# Patient Record
Sex: FEMALE | Race: BLACK OR AFRICAN AMERICAN | NOT HISPANIC OR LATINO | URBAN - METROPOLITAN AREA
[De-identification: names, ages, dates, MRNs, and addresses within clinical notes are randomized per-mention and may not be internally consistent; named-entity substitution may affect disease eponyms.]

---

## 2019-06-18 ENCOUNTER — OUTPATIENT (OUTPATIENT)
Dept: OUTPATIENT SERVICES | Facility: HOSPITAL | Age: 69
LOS: 1 days | End: 2019-06-18
Payer: MEDICARE

## 2019-06-18 VITALS
OXYGEN SATURATION: 97 % | HEIGHT: 60 IN | DIASTOLIC BLOOD PRESSURE: 59 MMHG | WEIGHT: 163.14 LBS | SYSTOLIC BLOOD PRESSURE: 106 MMHG | RESPIRATION RATE: 15 BRPM | HEART RATE: 76 BPM | TEMPERATURE: 97 F

## 2019-06-18 VITALS
RESPIRATION RATE: 14 BRPM | HEART RATE: 88 BPM | OXYGEN SATURATION: 100 % | SYSTOLIC BLOOD PRESSURE: 128 MMHG | DIASTOLIC BLOOD PRESSURE: 72 MMHG

## 2019-06-18 DIAGNOSIS — Z90.710 ACQUIRED ABSENCE OF BOTH CERVIX AND UTERUS: Chronic | ICD-10-CM

## 2019-06-18 DIAGNOSIS — Z41.9 ENCOUNTER FOR PROCEDURE FOR PURPOSES OTHER THAN REMEDYING HEALTH STATE, UNSPECIFIED: Chronic | ICD-10-CM

## 2019-06-18 DIAGNOSIS — H25.811 COMBINED FORMS OF AGE-RELATED CATARACT, RIGHT EYE: ICD-10-CM

## 2019-06-18 LAB — GLUCOSE BLDC GLUCOMTR-MCNC: 227 MG/DL — HIGH (ref 70–99)

## 2019-06-18 PROCEDURE — 66984 XCAPSL CTRC RMVL W/O ECP: CPT | Mod: RT

## 2019-06-18 PROCEDURE — C1780: CPT

## 2019-06-18 PROCEDURE — 82962 GLUCOSE BLOOD TEST: CPT

## 2019-06-18 NOTE — ASU DISCHARGE PLAN (ADULT/PEDIATRIC) - CARE PROVIDER_API CALL
Agatha Farrell)  Ophthalmology  [address] [place] [address], [address] [place] [address]  Phone: [phone]  Fax: [phone]  Follow Up Time:

## 2019-06-18 NOTE — ASU DISCHARGE PLAN (ADULT/PEDIATRIC) - CALL YOUR DOCTOR IF YOU HAVE ANY OF THE FOLLOWING:
Inability to tolerate liquids or foods/Nausea and vomiting that does not stop/Bleeding that does not stop/Pain not relieved by Medications

## 2019-10-05 PROBLEM — J44.9 CHRONIC OBSTRUCTIVE PULMONARY DISEASE, UNSPECIFIED: Chronic | Status: ACTIVE | Noted: 2019-06-18

## 2019-10-05 PROBLEM — E11.9 TYPE 2 DIABETES MELLITUS WITHOUT COMPLICATIONS: Chronic | Status: ACTIVE | Noted: 2019-06-18

## 2019-10-05 PROBLEM — G47.30 SLEEP APNEA, UNSPECIFIED: Chronic | Status: ACTIVE | Noted: 2019-06-18

## 2019-10-05 PROBLEM — E55.9 VITAMIN D DEFICIENCY, UNSPECIFIED: Chronic | Status: ACTIVE | Noted: 2019-06-18

## 2019-10-09 ENCOUNTER — OUTPATIENT (OUTPATIENT)
Dept: OUTPATIENT SERVICES | Facility: HOSPITAL | Age: 69
LOS: 1 days | End: 2019-10-09
Payer: MEDICARE

## 2019-10-09 VITALS
HEART RATE: 90 BPM | WEIGHT: 152.56 LBS | DIASTOLIC BLOOD PRESSURE: 78 MMHG | SYSTOLIC BLOOD PRESSURE: 122 MMHG | HEIGHT: 60 IN | TEMPERATURE: 98 F | RESPIRATION RATE: 13 BRPM | OXYGEN SATURATION: 95 %

## 2019-10-09 VITALS
RESPIRATION RATE: 15 BRPM | SYSTOLIC BLOOD PRESSURE: 111 MMHG | OXYGEN SATURATION: 95 % | DIASTOLIC BLOOD PRESSURE: 62 MMHG | HEART RATE: 89 BPM

## 2019-10-09 DIAGNOSIS — Z41.9 ENCOUNTER FOR PROCEDURE FOR PURPOSES OTHER THAN REMEDYING HEALTH STATE, UNSPECIFIED: Chronic | ICD-10-CM

## 2019-10-09 DIAGNOSIS — H25.812 COMBINED FORMS OF AGE-RELATED CATARACT, LEFT EYE: ICD-10-CM

## 2019-10-09 DIAGNOSIS — Z90.710 ACQUIRED ABSENCE OF BOTH CERVIX AND UTERUS: Chronic | ICD-10-CM

## 2019-10-09 DIAGNOSIS — Z98.41 CATARACT EXTRACTION STATUS, RIGHT EYE: Chronic | ICD-10-CM

## 2019-10-09 LAB — GLUCOSE BLDC GLUCOMTR-MCNC: 137 MG/DL — HIGH (ref 70–99)

## 2019-10-09 PROCEDURE — 82962 GLUCOSE BLOOD TEST: CPT

## 2019-10-09 PROCEDURE — V2632: CPT

## 2019-10-09 PROCEDURE — 66984 XCAPSL CTRC RMVL W/O ECP: CPT | Mod: LT

## 2019-10-09 NOTE — ASU PATIENT PROFILE, ADULT - VISION (WITH CORRECTIVE LENSES IF THE PATIENT USUALLY WEARS THEM):
Partially impaired: cannot see medication labels or newsprint, but can see obstacles in path, and the surrounding layout; can count fingers at arm's length/Left eye

## 2019-10-09 NOTE — ASU DISCHARGE PLAN (ADULT/PEDIATRIC) - NURSING INSTRUCTIONS
do not rub eye. tylenol for discomfort.  avoid advil motrin aleve aspirin to decrease chance of bleeding. eye kit given to pt.  Discharge and follow up  instructions explained to pt.  pt understands proper use of eye drops and instructions. f/u tomorrow do not rub eye. tylenol for discomfort.  avoid advil motrin aleve aspirin to decrease chance of bleeding. eye kit given to pt.  Discharge and follow up  instructions explained to pt.  pt understands proper use of eye drops and instructions. f/u tomorrow@[id number]

## 2019-10-09 NOTE — ASU PATIENT PROFILE, ADULT - PSH
Elective surgery  s/p right lung resection  S/P hysterectomy Elective surgery  s/p right lung resection  S/P cataract surgery, right    S/P hysterectomy

## 2019-10-09 NOTE — ASU DISCHARGE PLAN (ADULT/PEDIATRIC) - CALL YOUR DOCTOR IF YOU HAVE ANY OF THE FOLLOWING:
Nausea and vomiting that does not stop/Pain not relieved by Medications/Bleeding that does not stop/Inability to tolerate liquids or foods/Fever greater than (need to indicate Fahrenheit or Celsius)

## 2019-10-09 NOTE — ASU DISCHARGE PLAN (ADULT/PEDIATRIC) - CARE PROVIDER_API CALL
Agatha Farrell)  Ophthalmology  [address] [place] [address], [address] [place] [address]  Phone: [phone]  Fax: [phone]  Follow Up Time:

## 2021-02-26 NOTE — ASU PREOP CHECKLIST - NS PREOP CHK CHLOROHEX WASH
Detail Level: Zone
Introduction Text (Please End With A Colon): The following procedure was deferred:
Procedure To Be Performed At Next Visit: Curettage
N/A

## 2022-11-08 ENCOUNTER — OFFICE (OUTPATIENT)
Dept: URBAN - METROPOLITAN AREA CLINIC 35 | Facility: CLINIC | Age: 72
Setting detail: OPHTHALMOLOGY
End: 2022-11-08
Payer: COMMERCIAL

## 2022-11-08 DIAGNOSIS — H40.013: ICD-10-CM

## 2022-11-08 DIAGNOSIS — Z96.1: ICD-10-CM

## 2022-11-08 DIAGNOSIS — E11.9: ICD-10-CM

## 2022-11-08 DIAGNOSIS — H16.221: ICD-10-CM

## 2022-11-08 DIAGNOSIS — H35.61: ICD-10-CM

## 2022-11-08 DIAGNOSIS — H35.362: ICD-10-CM

## 2022-11-08 DIAGNOSIS — H18.513: ICD-10-CM

## 2022-11-08 DIAGNOSIS — H11.152: ICD-10-CM

## 2022-11-08 DIAGNOSIS — H40.033: ICD-10-CM

## 2022-11-08 PROCEDURE — 92250 FUNDUS PHOTOGRAPHY W/I&R: CPT | Performed by: OPHTHALMOLOGY

## 2022-11-08 PROCEDURE — 92014 COMPRE OPH EXAM EST PT 1/>: CPT | Performed by: OPHTHALMOLOGY

## 2022-11-08 ASSESSMENT — REFRACTION_MANIFEST
OD_SPHERE: PLANO
OD_ADD: +2.75
OD_AXIS: 075
OS_CYLINDER: -0.50
OD_VA1: 20/20
OD_VA1: 20/20
OD_CYLINDER: +0.75
OS_SPHERE: -0.25
OS_CYLINDER: -0.50
OD_VA1: 20/20
OD_CYLINDER: -0.50
OD_SPHERE: PLANO
OS_AXIS: 020
OD_AXIS: 165
OS_SPHERE: -0.25
OD_AXIS: 060
OD_CYLINDER: -0.25
OS_AXIS: 075
OS_CYLINDER: +0.50
OS_SPHERE: -0.25
OS_SPHERE: -0.75
OS_AXIS: 115
OD_CYLINDER: -0.25
OD_AXIS: 085
OD_CYLINDER: -0.50
OD_ADD: +2.75
OS_ADD: +2.75
OU_VA: 20/20
OS_CYLINDER: -0.25
OD_VA1: 20/20
OS_VA1: 20/20
OD_ADD: +2.75
OS_VA1: 20/20
OD_VA1: 20/20
OD_SPHERE: PLANO
OS_CYLINDER: -0.50
OD_ADD: +2.75
OS_SPHERE: -0.50
OS_VA1: 20/25
OD_AXIS: 085
OS_VA1: 20/25+
OS_AXIS: 175
OS_ADD: +2.75
OS_CYLINDER: -0.75
OD_SPHERE: -0.75
OD_AXIS: 075
OS_AXIS: 055
OS_ADD: +2.75
OD_SPHERE: -0.25
OS_AXIS: 020
OD_VA1: 20/20
OS_SPHERE: -0.25
OS_VA1: 20/20
OD_SPHERE: PLANO
OD_CYLINDER: -0.75
OS_VA1: 20/20
OS_ADD: +2.75

## 2022-11-08 ASSESSMENT — REFRACTION_CURRENTRX
OS_SPHERE: PLANO
OS_SPHERE: +1.25
OD_SPHERE: +2.00
OS_CYLINDER: -0.75
OS_OVR_VA: 20/
OS_ADD: +2.25
OD_OVR_VA: 20/
OS_AXIS: 019
OD_SPHERE: +0.25
OD_VPRISM_DIRECTION: PROGS
OS_VPRISM_DIRECTION: PROGS
OS_AXIS: 008
OD_AXIS: 067
OS_ADD: +2.75
OS_OVR_VA: 20/
OD_ADD: +2.75
OD_CYLINDER: -1.50
OD_ADD: +2.25
OD_AXIS: 088
OD_CYLINDER: -0.50
OD_OVR_VA: 20/
OS_CYLINDER: -0.50

## 2022-11-08 ASSESSMENT — KERATOMETRY
OS_K2POWER_DIOPTERS: 44.25
OS_K1POWER_DIOPTERS: 43.00
OS_AXISANGLE_DEGREES: 086
METHOD_AUTO_MANUAL: AUTO
OD_K2POWER_DIOPTERS: 43.75
OD_K1POWER_DIOPTERS: 43.00
OD_AXISANGLE_DEGREES: 109

## 2022-11-08 ASSESSMENT — REFRACTION_AUTOREFRACTION
OD_AXIS: 060
OD_SPHERE: PLANO
OS_SPHERE: +0.25
OS_AXIS: 012
OD_CYLINDER: -0.50
OS_CYLINDER: -0.25

## 2022-11-08 ASSESSMENT — AXIALLENGTH_DERIVED
OS_AL: 23.7912
OS_AL: 23.7417
OD_AL: 23.7855
OS_AL: 23.7417
OS_AL: 23.7912
OS_AL: 23.4977
OD_AL: 23.7855
OS_AL: 23.7417
OS_AL: 23.7417

## 2022-11-08 ASSESSMENT — PACHYMETRY
OS_CT_UM: 568
OS_CT_CORRECTION: -1
OD_CT_CORRECTION: -3
OD_CT_UM: 582

## 2022-11-08 ASSESSMENT — SPHEQUIV_DERIVED
OS_SPHEQUIV: 0.125
OD_SPHEQUIV: -0.375
OD_SPHEQUIV: -0.375
OS_SPHEQUIV: -0.5
OS_SPHEQUIV: -0.625
OS_SPHEQUIV: -0.5
OS_SPHEQUIV: -0.625

## 2022-11-08 ASSESSMENT — CONFRONTATIONAL VISUAL FIELD TEST (CVF)
OD_FINDINGS: FULL
OS_FINDINGS: FULL

## 2022-11-08 ASSESSMENT — VISUAL ACUITY
OD_BCVA: 20/20
OS_BCVA: 20/20-2

## 2022-11-08 ASSESSMENT — CORNEAL DYSTROPHY - POSTERIOR
OS_POSTERIORDYSTROPHY: T 1+ FUCHS
OD_POSTERIORDYSTROPHY: T 1+ FUCHS

## 2022-11-08 ASSESSMENT — TONOMETRY
OD_IOP_MMHG: 17
OS_IOP_MMHG: 17

## 2022-12-06 ENCOUNTER — OFFICE (OUTPATIENT)
Dept: URBAN - METROPOLITAN AREA CLINIC 35 | Facility: CLINIC | Age: 72
Setting detail: OPHTHALMOLOGY
End: 2022-12-06
Payer: COMMERCIAL

## 2022-12-06 DIAGNOSIS — E11.9: ICD-10-CM

## 2022-12-06 DIAGNOSIS — H35.61: ICD-10-CM

## 2022-12-06 DIAGNOSIS — H40.013: ICD-10-CM

## 2022-12-06 DIAGNOSIS — H40.033: ICD-10-CM

## 2022-12-06 PROCEDURE — 99213 OFFICE O/P EST LOW 20 MIN: CPT | Performed by: OPHTHALMOLOGY

## 2022-12-06 ASSESSMENT — CONFRONTATIONAL VISUAL FIELD TEST (CVF)
OS_FINDINGS: FULL
OD_FINDINGS: FULL

## 2022-12-06 ASSESSMENT — REFRACTION_MANIFEST
OD_SPHERE: PLANO
OD_ADD: +2.75
OD_VA1: 20/20
OD_SPHERE: PLANO
OS_CYLINDER: -0.75
OS_CYLINDER: -0.50
OS_VA1: 20/25
OS_CYLINDER: -0.50
OS_VA1: 20/20
OS_ADD: +2.75
OS_AXIS: 175
OD_VA1: 20/20
OS_AXIS: 020
OS_ADD: +2.75
OD_CYLINDER: -0.25
OS_SPHERE: -0.75
OD_AXIS: 165
OD_CYLINDER: -0.75
OS_VA1: 20/25+
OS_VA1: 20/20
OD_AXIS: 085
OD_VA1: 20/20
OS_ADD: +2.75
OS_AXIS: 020
OD_ADD: +2.75
OD_AXIS: 085
OS_VA1: 20/20
OD_CYLINDER: -0.50
OS_AXIS: 075
OD_AXIS: 075
OS_CYLINDER: -0.50
OU_VA: 20/20
OD_AXIS: 060
OS_SPHERE: -0.25
OD_CYLINDER: -0.50
OS_ADD: +2.75
OS_CYLINDER: +0.50
OD_CYLINDER: +0.75
OD_ADD: +2.75
OS_SPHERE: -0.25
OD_CYLINDER: -0.25
OS_AXIS: 115
OS_CYLINDER: -0.25
OS_SPHERE: -0.25
OS_SPHERE: -0.25
OD_SPHERE: PLANO
OS_AXIS: 055
OD_VA1: 20/20
OS_SPHERE: -0.50
OS_VA1: 20/20
OD_SPHERE: -0.75
OD_AXIS: 075
OD_ADD: +2.75
OD_SPHERE: PLANO
OD_SPHERE: -0.25

## 2022-12-06 ASSESSMENT — KERATOMETRY
OD_K2POWER_DIOPTERS: 43.75
OS_K1POWER_DIOPTERS: 43.00
OD_AXISANGLE_DEGREES: 109
METHOD_AUTO_MANUAL: AUTO
OD_K1POWER_DIOPTERS: 43.00
OS_K2POWER_DIOPTERS: 44.25
OS_AXISANGLE_DEGREES: 086

## 2022-12-06 ASSESSMENT — REFRACTION_CURRENTRX
OS_AXIS: 120
OS_ADD: +2.75
OD_SPHERE: +2.00
OS_CYLINDER: -1.00
OD_AXIS: 088
OS_SPHERE: PLANO
OD_AXIS: 068
OS_OVR_VA: 20/
OS_ADD: +2.75
OD_ADD: +2.75
OD_CYLINDER: -0.50
OS_VPRISM_DIRECTION: PROGS
OS_OVR_VA: 20/
OD_OVR_VA: 20/
OD_VPRISM_DIRECTION: PROGS
OD_ADD: +2.75
OD_CYLINDER: -1.50
OS_AXIS: 019
OS_SPHERE: +1.25
OS_CYLINDER: -0.75
OD_SPHERE: +0.25
OD_OVR_VA: 20/

## 2022-12-06 ASSESSMENT — SPHEQUIV_DERIVED
OS_SPHEQUIV: 0.125
OD_SPHEQUIV: -0.375
OS_SPHEQUIV: -0.625
OD_SPHEQUIV: -0.375
OS_SPHEQUIV: -0.5
OS_SPHEQUIV: -0.5
OS_SPHEQUIV: -0.625
OS_SPHEQUIV: -0.5
OS_SPHEQUIV: -0.5

## 2022-12-06 ASSESSMENT — PACHYMETRY
OD_CT_UM: 582
OS_CT_CORRECTION: -1
OS_CT_UM: 568
OD_CT_CORRECTION: -3

## 2022-12-06 ASSESSMENT — AXIALLENGTH_DERIVED
OS_AL: 23.4977
OS_AL: 23.7912
OS_AL: 23.7417
OD_AL: 23.7855
OS_AL: 23.7417
OD_AL: 23.7855
OS_AL: 23.7417
OS_AL: 23.7417
OS_AL: 23.7912

## 2022-12-06 ASSESSMENT — REFRACTION_AUTOREFRACTION
OS_CYLINDER: -0.25
OD_SPHERE: PLANO
OS_AXIS: 012
OD_CYLINDER: -0.50
OS_SPHERE: +0.25
OD_AXIS: 060

## 2022-12-06 ASSESSMENT — TONOMETRY
OS_IOP_MMHG: 19
OD_IOP_MMHG: 19

## 2022-12-06 ASSESSMENT — CORNEAL DYSTROPHY - POSTERIOR
OS_POSTERIORDYSTROPHY: T 1+ FUCHS
OD_POSTERIORDYSTROPHY: T 1+ FUCHS

## 2022-12-06 ASSESSMENT — VISUAL ACUITY
OS_BCVA: 20/20-2
OD_BCVA: 20/20-1

## 2023-03-13 ENCOUNTER — OFFICE (OUTPATIENT)
Dept: URBAN - METROPOLITAN AREA CLINIC 35 | Facility: CLINIC | Age: 73
Setting detail: OPHTHALMOLOGY
End: 2023-03-13
Payer: COMMERCIAL

## 2023-03-13 DIAGNOSIS — H40.013: ICD-10-CM

## 2023-03-13 DIAGNOSIS — H40.033: ICD-10-CM

## 2023-03-13 DIAGNOSIS — H35.61: ICD-10-CM

## 2023-03-13 DIAGNOSIS — E11.9: ICD-10-CM

## 2023-03-13 DIAGNOSIS — Z96.1: ICD-10-CM

## 2023-03-13 DIAGNOSIS — H35.362: ICD-10-CM

## 2023-03-13 DIAGNOSIS — H16.221: ICD-10-CM

## 2023-03-13 DIAGNOSIS — H11.152: ICD-10-CM

## 2023-03-13 DIAGNOSIS — H18.513: ICD-10-CM

## 2023-03-13 PROCEDURE — 99214 OFFICE O/P EST MOD 30 MIN: CPT | Performed by: OPHTHALMOLOGY

## 2023-03-13 PROCEDURE — 92250 FUNDUS PHOTOGRAPHY W/I&R: CPT | Performed by: OPHTHALMOLOGY

## 2023-03-13 ASSESSMENT — REFRACTION_MANIFEST
OD_SPHERE: PLANO
OS_ADD: +2.75
OD_CYLINDER: +0.75
OS_CYLINDER: -0.75
OS_AXIS: 175
OS_VA1: 20/20
OD_ADD: +2.75
OS_SPHERE: -0.25
OD_AXIS: 085
OD_VA1: 20/20
OU_VA: 20/20
OD_VA1: 20/20
OS_AXIS: 115
OD_CYLINDER: -0.25
OS_VA1: 20/20
OD_SPHERE: PLANO
OD_AXIS: 075
OS_CYLINDER: -0.50
OD_SPHERE: -0.75
OS_VA1: 20/25
OD_SPHERE: PLANO
OD_CYLINDER: -0.50
OS_CYLINDER: +0.50
OS_VA1: 20/20
OS_AXIS: 055
OS_VA1: 20/25+
OS_SPHERE: -0.50
OS_VA1: 20/20
OS_AXIS: 020
OD_VA1: 20/20
OD_SPHERE: -0.25
OD_VA1: 20/20
OS_SPHERE: -0.25
OS_SPHERE: -0.25
OD_CYLINDER: -0.75
OS_AXIS: 020
OD_CYLINDER: -0.50
OD_ADD: +2.75
OS_ADD: +2.75
OD_AXIS: 085
OS_ADD: +2.75
OS_SPHERE: -0.75
OD_AXIS: 060
OD_AXIS: 165
OD_SPHERE: PLANO
OD_ADD: +2.75
OS_SPHERE: -0.25
OD_VA1: 20/20
OS_AXIS: 075
OD_VA1: 20/20
OS_ADD: +2.75
OS_CYLINDER: -0.50
OD_CYLINDER: -0.25
OS_CYLINDER: -0.50
OD_AXIS: 075
OS_CYLINDER: -0.25
OD_ADD: +2.75

## 2023-03-13 ASSESSMENT — REFRACTION_AUTOREFRACTION
OS_AXIS: 012
OD_SPHERE: PLANO
OD_CYLINDER: -0.50
OS_CYLINDER: -0.25
OS_SPHERE: +0.25
OD_AXIS: 060

## 2023-03-13 ASSESSMENT — REFRACTION_CURRENTRX
OD_AXIS: 088
OD_SPHERE: +0.25
OD_ADD: +2.75
OD_OVR_VA: 20/
OS_AXIS: 016
OS_AXIS: 019
OD_OVR_VA: 20/
OS_SPHERE: PLANO
OS_CYLINDER: -0.75
OD_VPRISM_DIRECTION: PROGS
OS_SPHERE: PLANO
OD_SPHERE: +0.25
OS_VPRISM_DIRECTION: PROGS
OS_ADD: +2.75
OD_CYLINDER: -0.50
OD_ADD: +2.75
OS_OVR_VA: 20/
OD_CYLINDER: -0.50
OD_AXIS: 086
OS_OVR_VA: 20/
OS_ADD: +2.75
OS_CYLINDER: -0.75

## 2023-03-13 ASSESSMENT — AXIALLENGTH_DERIVED
OS_AL: 23.7417
OS_AL: 23.7417
OD_AL: 23.7855
OS_AL: 23.7912
OS_AL: 23.7417
OS_AL: 23.7912
OS_AL: 23.4977
OD_AL: 23.7855
OS_AL: 23.7417

## 2023-03-13 ASSESSMENT — SPHEQUIV_DERIVED
OS_SPHEQUIV: -0.5
OS_SPHEQUIV: -0.625
OS_SPHEQUIV: 0.125
OS_SPHEQUIV: -0.5
OD_SPHEQUIV: -0.375
OS_SPHEQUIV: -0.5
OS_SPHEQUIV: -0.5
OS_SPHEQUIV: -0.625
OD_SPHEQUIV: -0.375

## 2023-03-13 ASSESSMENT — TONOMETRY
OS_IOP_MMHG: 19
OD_IOP_MMHG: 21

## 2023-03-13 ASSESSMENT — PACHYMETRY
OD_CT_UM: 582
OD_CT_CORRECTION: -3
OS_CT_UM: 568
OS_CT_CORRECTION: -1

## 2023-03-13 ASSESSMENT — KERATOMETRY
OD_AXISANGLE_DEGREES: 109
OS_K2POWER_DIOPTERS: 44.25
OS_K1POWER_DIOPTERS: 43.00
OD_K1POWER_DIOPTERS: 43.00
METHOD_AUTO_MANUAL: AUTO
OD_K2POWER_DIOPTERS: 43.75
OS_AXISANGLE_DEGREES: 086

## 2023-03-13 ASSESSMENT — CONFRONTATIONAL VISUAL FIELD TEST (CVF)
OS_FINDINGS: FULL
OD_FINDINGS: FULL

## 2023-03-13 ASSESSMENT — VISUAL ACUITY
OD_BCVA: 20/25+2
OS_BCVA: 20/20-3

## 2023-03-13 ASSESSMENT — CORNEAL DYSTROPHY - POSTERIOR
OD_POSTERIORDYSTROPHY: T 1+ FUCHS
OS_POSTERIORDYSTROPHY: T 1+ FUCHS

## 2023-09-11 ENCOUNTER — OFFICE (OUTPATIENT)
Dept: URBAN - METROPOLITAN AREA CLINIC 35 | Facility: CLINIC | Age: 73
Setting detail: OPHTHALMOLOGY
End: 2023-09-11
Payer: MEDICAID

## 2023-09-11 DIAGNOSIS — H35.362: ICD-10-CM

## 2023-09-11 DIAGNOSIS — E11.9: ICD-10-CM

## 2023-09-11 DIAGNOSIS — H35.61: ICD-10-CM

## 2023-09-11 DIAGNOSIS — H40.033: ICD-10-CM

## 2023-09-11 DIAGNOSIS — H40.013: ICD-10-CM

## 2023-09-11 PROCEDURE — 92250 FUNDUS PHOTOGRAPHY W/I&R: CPT | Performed by: OPHTHALMOLOGY

## 2023-09-11 PROCEDURE — 99213 OFFICE O/P EST LOW 20 MIN: CPT | Performed by: OPHTHALMOLOGY

## 2023-09-11 ASSESSMENT — AXIALLENGTH_DERIVED
OS_AL: 23.6435
OS_AL: 23.7417
OS_AL: 23.7417
OD_AL: 23.8323
OS_AL: 23.7912
OD_AL: 23.6841
OS_AL: 23.7417
OD_AL: 23.8323
OS_AL: 23.7912
OS_AL: 23.7417

## 2023-09-11 ASSESSMENT — REFRACTION_MANIFEST
OD_CYLINDER: -0.75
OD_SPHERE: PLANO
OD_AXIS: 075
OS_SPHERE: PLANO
OD_SPHERE: PLANO
OD_SPHERE: PLANO
OS_AXIS: 075
OD_VA1: 20/20
OS_AXIS: 010
OD_AXIS: 060
OS_AXIS: 020
OS_VA1: 20/20
OD_VA1: 20/20-3
OS_SPHERE: -0.25
OD_CYLINDER: -0.25
OD_ADD: +2.75
OU_VA: 20/20
OS_SPHERE: -0.50
OS_CYLINDER: -0.75
OS_CYLINDER: +0.50
OD_SPHERE: -0.75
OD_ADD: +2.75
OS_SPHERE: -0.75
OS_ADD: +2.75
OS_CYLINDER: -0.50
OS_ADD: +2.75
OD_VA1: 20/20
OD_VA1: 20/20
OS_AXIS: 055
OS_CYLINDER: -0.50
OS_ADD: +2.75
OS_SPHERE: -0.25
OS_ADD: +2.75
OD_VA1: 20/20
OD_AXIS: 085
OD_SPHERE: +0.50
OS_VA1: 20/20
OD_AXIS: 075
OS_AXIS: 175
OD_CYLINDER: -0.50
OS_VA1: 20/25+
OD_SPHERE: PLANO
OS_CYLINDER: -0.25
OS_SPHERE: -0.25
OS_CYLINDER: -0.50
OD_ADD: +2.75
OD_ADD: +2.75
OD_SPHERE: -0.25
OS_VA1: 20/20
OS_VA1: 20/20
OD_CYLINDER: -0.50
OS_CYLINDER: -0.25
OS_AXIS: 020
OD_VA1: 20/20
OS_AXIS: 115
OD_CYLINDER: -0.25
OS_VA1: 20/20
OD_AXIS: 085
OD_CYLINDER: -1.00
OD_CYLINDER: +0.75
OS_SPHERE: -0.25
OD_AXIS: 165
OS_VA1: 20/25
OD_AXIS: 085
OD_VA1: 20/20

## 2023-09-11 ASSESSMENT — REFRACTION_CURRENTRX
OD_AXIS: 088
OS_SPHERE: PLANO
OS_CYLINDER: -0.75
OD_SPHERE: +0.25
OD_ADD: +2.75
OS_CYLINDER: -0.50
OD_CYLINDER: -0.50
OS_SPHERE: -0.25
OD_ADD: +2.75
OD_OVR_VA: 20/
OS_AXIS: 019
OD_AXIS: 088
OD_VPRISM_DIRECTION: PROGS
OS_AXIS: 020
OS_VPRISM_DIRECTION: PROGS
OD_CYLINDER: -0.50
OD_SPHERE: PLANO
OS_ADD: +2.75
OD_OVR_VA: 20/
OS_OVR_VA: 20/
OS_OVR_VA: 20/
OS_ADD: +2.75

## 2023-09-11 ASSESSMENT — SPHEQUIV_DERIVED
OS_SPHEQUIV: -0.5
OD_SPHEQUIV: -0.375
OS_SPHEQUIV: -0.5
OS_SPHEQUIV: -0.25
OS_SPHEQUIV: -0.625
OS_SPHEQUIV: -0.625
OD_SPHEQUIV: 0
OD_SPHEQUIV: -0.375

## 2023-09-11 ASSESSMENT — KERATOMETRY
OD_AXISANGLE_DEGREES: 095
OD_K1POWER_DIOPTERS: 43.00
OS_K2POWER_DIOPTERS: 44.00
OS_AXISANGLE_DEGREES: 086
OS_K1POWER_DIOPTERS: 43.25
OD_K2POWER_DIOPTERS: 43.50
METHOD_AUTO_MANUAL: AUTO

## 2023-09-11 ASSESSMENT — PACHYMETRY
OS_CT_CORRECTION: -1
OS_CT_UM: 568
OD_CT_CORRECTION: -3
OD_CT_UM: 582

## 2023-09-11 ASSESSMENT — CORNEAL DYSTROPHY - POSTERIOR
OS_POSTERIORDYSTROPHY: T 1+ FUCHS
OD_POSTERIORDYSTROPHY: T 1+ FUCHS

## 2023-09-11 ASSESSMENT — REFRACTION_AUTOREFRACTION
OS_SPHERE: 0.00
OD_AXIS: 088
OD_SPHERE: PLANO
OD_CYLINDER: -0.75
OS_CYLINDER: -0.50
OS_AXIS: 012

## 2023-09-11 ASSESSMENT — TONOMETRY
OS_IOP_MMHG: 16
OD_IOP_MMHG: 16

## 2023-09-11 ASSESSMENT — VISUAL ACUITY
OD_BCVA: 20/25-1
OS_BCVA: 20/25-3